# Patient Record
Sex: FEMALE | Race: WHITE | NOT HISPANIC OR LATINO
[De-identification: names, ages, dates, MRNs, and addresses within clinical notes are randomized per-mention and may not be internally consistent; named-entity substitution may affect disease eponyms.]

---

## 2017-02-23 ENCOUNTER — RECORDS - HEALTHEAST (OUTPATIENT)
Dept: ADMINISTRATIVE | Facility: OTHER | Age: 57
End: 2017-02-23

## 2017-06-27 ENCOUNTER — RECORDS - HEALTHEAST (OUTPATIENT)
Dept: ADMINISTRATIVE | Facility: OTHER | Age: 57
End: 2017-06-27

## 2017-07-11 ENCOUNTER — RECORDS - HEALTHEAST (OUTPATIENT)
Dept: ADMINISTRATIVE | Facility: OTHER | Age: 57
End: 2017-07-11

## 2017-09-25 ENCOUNTER — RECORDS - HEALTHEAST (OUTPATIENT)
Dept: ADMINISTRATIVE | Facility: OTHER | Age: 57
End: 2017-09-25

## 2017-09-28 ENCOUNTER — RECORDS - HEALTHEAST (OUTPATIENT)
Dept: ADMINISTRATIVE | Facility: OTHER | Age: 57
End: 2017-09-28

## 2017-09-29 ENCOUNTER — RECORDS - HEALTHEAST (OUTPATIENT)
Dept: ADMINISTRATIVE | Facility: OTHER | Age: 57
End: 2017-09-29

## 2017-09-30 ENCOUNTER — RECORDS - HEALTHEAST (OUTPATIENT)
Dept: ADMINISTRATIVE | Facility: OTHER | Age: 57
End: 2017-09-30

## 2017-10-02 ENCOUNTER — RECORDS - HEALTHEAST (OUTPATIENT)
Dept: ADMINISTRATIVE | Facility: OTHER | Age: 57
End: 2017-10-02

## 2017-10-06 ENCOUNTER — COMMUNICATION - HEALTHEAST (OUTPATIENT)
Dept: TELEHEALTH | Facility: CLINIC | Age: 57
End: 2017-10-06

## 2017-10-06 ENCOUNTER — OFFICE VISIT - HEALTHEAST (OUTPATIENT)
Dept: ONCOLOGY | Facility: HOSPITAL | Age: 57
End: 2017-10-06

## 2017-10-06 DIAGNOSIS — D68.51 FACTOR V LEIDEN MUTATION (H): ICD-10-CM

## 2017-10-06 DIAGNOSIS — I82.5Y9 CHRONIC DEEP VEIN THROMBOSIS (DVT) OF PROXIMAL VEIN OF LOWER EXTREMITY, UNSPECIFIED LATERALITY (H): ICD-10-CM

## 2017-10-06 RX ORDER — DOXYCYCLINE 100 MG/1
100 CAPSULE ORAL 2 TIMES DAILY
Status: SHIPPED | COMMUNITY
Start: 2017-09-29

## 2017-10-06 RX ORDER — NYSTATIN 500000 [USP'U]/1
1 TABLET, COATED ORAL 3 TIMES DAILY
Status: SHIPPED | COMMUNITY
Start: 2017-10-06

## 2017-10-06 RX ORDER — LEVOTHYROXINE SODIUM 125 UG/1
125 TABLET ORAL DAILY
Status: SHIPPED | COMMUNITY
Start: 2017-10-06

## 2017-10-06 RX ORDER — CAT'S CLAW 500 MG
CAPSULE ORAL
Status: SHIPPED | COMMUNITY
Start: 2017-10-06

## 2017-10-06 RX ORDER — CETIRIZINE HYDROCHLORIDE 10 MG/1
10 TABLET ORAL DAILY
Status: SHIPPED | COMMUNITY
Start: 2017-10-06

## 2017-10-06 RX ORDER — LIOTHYRONINE SODIUM 5 UG/1
15 TABLET ORAL DAILY
Status: SHIPPED | COMMUNITY
Start: 2017-10-06

## 2017-10-06 RX ORDER — UBIDECARENONE 100 MG
200 CAPSULE ORAL DAILY
Status: SHIPPED | COMMUNITY
Start: 2017-10-06

## 2017-10-06 RX ORDER — ASCORBIC ACID 500 MG
500 TABLET ORAL PRN
Status: SHIPPED | COMMUNITY
Start: 2017-10-06

## 2017-10-06 RX ORDER — CHLORAL HYDRATE 500 MG
4 CAPSULE ORAL 2 TIMES DAILY
Status: SHIPPED | COMMUNITY
Start: 2017-10-06

## 2017-10-06 RX ORDER — WARFARIN SODIUM 5 MG/1
5 TABLET ORAL DAILY
Status: SHIPPED | COMMUNITY
Start: 2017-10-06

## 2017-10-06 ASSESSMENT — MIFFLIN-ST. JEOR: SCORE: 1736.4

## 2019-11-10 ENCOUNTER — NURSE TRIAGE (OUTPATIENT)
Dept: NURSING | Facility: CLINIC | Age: 59
End: 2019-11-10

## 2019-11-10 NOTE — TELEPHONE ENCOUNTER
Patient says she might have a contact stuck in her eye or she may have scratched her cornea from trying to take out a contact lens yesterday afternoon.  Patient says it is constantly watering.  Patient has put saline drop in there to try to remove the lens.  Patient's  says she still has a lens in.  Reviewed care advice with caller per RN triage protocol.  Caller verbalized understanding.      Reason for Disposition    [1] Contact lens stuck in eye AND [2] unable to remove using CARE ADVICE    Additional Information    Negative: Foreign body (FB) stuck on eyeball  (Exception: contact lens)    Negative: [1] Sharp FB (even if FB was removed) AND [2] any pain present now    Negative: [1] Eye has been washed out > 30 minutes ago AND [2] feels like FB is still present    Negative: [1] Eye has been washed out > 30 minutes ago AND [2] pain persists AND [3] more than mild    Negative: [1] Eye has been washed out > 30 minutes ago AND [2] tearing or blinking persists    Negative: [1] Eye has been washed out > 30 minutes ago AND [2] blurred vision persists    Negative: FB hit eye at high speed  (e.g., small metallic chip from hammering, lawnmower, BB gun, explosion)    Protocols used: EYE - FOREIGN BODY-A-AH

## 2021-05-25 ENCOUNTER — RECORDS - HEALTHEAST (OUTPATIENT)
Dept: ADMINISTRATIVE | Facility: CLINIC | Age: 61
End: 2021-05-25

## 2021-05-31 VITALS — BODY MASS INDEX: 37.45 KG/M2 | HEIGHT: 68 IN | WEIGHT: 247.1 LBS

## 2021-06-13 NOTE — CONSULTS
[facility] Hematology and Oncology Consult Note    Patient: Shirin Johnson  MRN: [id number]  Date of Service: 10/06/2017      Reason for Visit:    1.  Recurrent venous thrombosis    Assessment/Plan:    1.  Recurrent venous thrombosis: The patient had an initial DVT in 2008.  This occurred when she was in her 40s.  Birth history was unprovoked.  She knows of recurrent DVT in the left leg.  Most likely unprovoked.  Both sound to be extensive.  She also has a family history.  All things considered, I think she should be on indefinite anticoagulation.  She should continue anticoagulation as long as it is safe and well-tolerated.  I do not think she needs further hypercoagulable testing.  I told her I did not think her children need to be tested.  She will stop her aspirin.  She had multiple questions which I believe were answered to her satisfaction.  We did talk about Xarelto.  I think this would be a good option for her.  She will look into the cost.  I told her to let us know if she needs us to prescribe that for her.  Otherwise she will follow-up on an as-needed basis.    ECOG Performance   ECOG Performance Status: 0    Distress Assessment  Distress Assessment Score: No distress    Problem List:    1. Chronic deep vein thrombosis (DVT) of proximal vein of lower extremity, unspecified laterality     2. Factor V Leiden mutation       Staging History:    No matching staging information was found for the patient.    History:    Shirin is a 57-year-old woman who is referred for follow-up regarding a DVT.  She had an initial DVT in February 2008.  She was not on estrogen at the time.  No other provoking factors per the patient's report.  She was treated with Coumadin.  More recently she was diagnosed with a second DVT in the left leg.  Both seem to be extensive.  She has swelling throughout the leg.  Not a lot of pain.  No shortness of breath or coughing currently.  She did travel to [place] 3 weeks prior to symptom  onset but she states she was never sitting in a car for more than three hours without a break.  No acute complaints today.    Past History:    Past Medical History:   Diagnosis Date     Clotting disorder      Sleep apnea     History reviewed. No pertinent family history.   Her mother has had multiple clots per the patient's report.  A maternal aunt is also had clots.     [unfilled] Social History     Social History     Marital status:      Spouse name: N/A     Number of children: N/A     Years of education: N/A     Occupational History     Not on file.     Social History Main Topics     Smoking status: Never Smoker     Smokeless tobacco: Never Used     Alcohol use No     Drug use: No     Sexual activity: No     Other Topics Concern     Not on file     Social History Narrative     No narrative on file        Allergies:    Allergies   Allergen Reactions     Casein Myalgia     Constipation, burning       Egg White      Heartburn       Gluten Myalgia     Joint pain, constipation, burning       Penicillins Hives     Tessalon [Benzonatate] Hives     Zithromax [Azithromycin] Hives     Xanax [Alprazolam] Anxiety     Review of Systems:    As above in the history.     Review of Systems otherwise Negative for:  General: chills, fever or night sweats  Psychological: anxiety or depression  Ophthalmic: blurry vision, double vision or loss of vision, vision change  ENT: epistaxis, oral lesions, hearing changes  Hematological and Lymphatic: bleeding, bruising, jaundice, swollen lymph nodes  Endocrine: hot flashes, unexpected weight changes  Respiratory: cough, hemoptysis, orthopnea or shortness of breath/ORONA  Cardiovascular: chest pain, palpitations or PND  Gastrointestinal: abdominal pain, blood in stools, change in bowel habits, constipation, diarrhea or nausea/vomiting  Genito-Urinary: change in urinary stream, incontinence, frequency/urgency  Musculoskeletal: joint pain, stiffness, swelling, muscle pain or  "weakness  Neurological: dizziness, headaches, numbness/tingling  Dermatological: lumps and rash    ECOG performance status is 0.    Pain  Currently in Pain: Yes  Pain Score (Initial OR Reassessment): 3  Pain Frequency: Constant/continuous  Location: left leg  Pain Characteristics : Aching  Pain Intervention(s): Medication (See MAR)  Response to Interventions: still there    Physical Exam:    Recent Vitals 10/6/2017   Height 5' 7.5\"   Weight 247 lbs 2 oz   BSA (m2) 2.31 m2   /72   Pulse 82   Temp 98.3   Temp src 1   SpO2 96     General: patient appears stated age of 57 y.o.. Nontoxic and in no distress.   HEENT: Head: atraumatic, normocephalic. Sclerae anicteric.  Chest:  Normal respiratory effort  Cardiac:  No edema.   Abdomen: abdomen is non-distended  Extremities: normal tone and muscle bulk.   Skin: no lesions or rash. Warm and dry.   CNS: alert and oriented x3. Grossly non-focal.   Psychiatric: normal mood and affect.     Lab Results:    No results found for this or any previous visit (from the past 168 hour(s)).    Imaging Results:    No results found.      Signed by: Benton Stoner MD    "

## 2021-06-16 PROBLEM — D68.51 FACTOR V LEIDEN MUTATION (H): Status: ACTIVE | Noted: 2017-10-06

## 2021-06-16 PROBLEM — I82.509 CHRONIC DEEP VEIN THROMBOSIS (DVT) OF LOWER EXTREMITY (H): Status: ACTIVE | Noted: 2017-10-06

## 2022-08-01 LAB — PAP SMEAR - HIM PATIENT REPORTED: NORMAL

## 2023-11-01 ENCOUNTER — TRANSFERRED RECORDS (OUTPATIENT)
Dept: MULTI SPECIALTY CLINIC | Facility: CLINIC | Age: 63
End: 2023-11-01

## 2025-05-27 ENCOUNTER — OFFICE VISIT (OUTPATIENT)
Dept: FAMILY MEDICINE | Facility: CLINIC | Age: 65
End: 2025-05-27
Payer: COMMERCIAL

## 2025-05-27 VITALS
TEMPERATURE: 98.3 F | DIASTOLIC BLOOD PRESSURE: 87 MMHG | OXYGEN SATURATION: 98 % | SYSTOLIC BLOOD PRESSURE: 146 MMHG | RESPIRATION RATE: 16 BRPM | BODY MASS INDEX: 36.63 KG/M2 | HEART RATE: 67 BPM | WEIGHT: 233.4 LBS | HEIGHT: 67 IN

## 2025-05-27 DIAGNOSIS — Z76.89 ENCOUNTER TO ESTABLISH CARE: ICD-10-CM

## 2025-05-27 DIAGNOSIS — D68.51 HETEROZYGOUS FACTOR V LEIDEN MUTATION: ICD-10-CM

## 2025-05-27 DIAGNOSIS — R01.1 SYSTOLIC MURMUR: ICD-10-CM

## 2025-05-27 DIAGNOSIS — G43.111 INTRACTABLE MIGRAINE WITH AURA WITH STATUS MIGRAINOSUS: Primary | ICD-10-CM

## 2025-05-27 DIAGNOSIS — Z86.718 PERSONAL HISTORY OF DVT (DEEP VEIN THROMBOSIS): ICD-10-CM

## 2025-05-27 DIAGNOSIS — R42 DIZZINESS: ICD-10-CM

## 2025-05-27 PROBLEM — H40.1134 PRIMARY OPEN ANGLE GLAUCOMA (POAG) OF BOTH EYES, INDETERMINATE STAGE: Status: RESOLVED | Noted: 2019-02-13 | Resolved: 2025-05-27

## 2025-05-27 PROBLEM — F41.9 ANXIETY: Status: ACTIVE | Noted: 2024-02-12

## 2025-05-27 PROBLEM — G89.29 CHRONIC PAIN OF BOTH KNEES: Status: ACTIVE | Noted: 2022-01-20

## 2025-05-27 PROBLEM — E78.5 HYPERLIPIDEMIA: Status: ACTIVE | Noted: 2025-02-27

## 2025-05-27 PROBLEM — J30.9 ALLERGIC RHINITIS: Status: ACTIVE | Noted: 2024-07-25

## 2025-05-27 PROBLEM — I48.0 PAROXYSMAL ATRIAL FIBRILLATION WITH RVR (H): Chronic | Status: ACTIVE | Noted: 2021-03-15

## 2025-05-27 PROBLEM — I48.91 ATRIAL FIBRILLATION (H): Status: RESOLVED | Noted: 2025-02-27 | Resolved: 2025-05-27

## 2025-05-27 PROBLEM — D17.1 LIPOMA OF TORSO: Status: ACTIVE | Noted: 2018-10-05

## 2025-05-27 PROBLEM — R60.0 EDEMA OF LOWER EXTREMITY: Status: ACTIVE | Noted: 2025-05-27

## 2025-05-27 PROBLEM — G44.219 EPISODIC TENSION-TYPE HEADACHE, NOT INTRACTABLE: Status: ACTIVE | Noted: 2024-04-18

## 2025-05-27 PROBLEM — M81.0 OSTEOPOROSIS: Status: ACTIVE | Noted: 2023-03-13

## 2025-05-27 PROBLEM — R93.1 ELEVATED CORONARY ARTERY CALCIUM SCORE: Status: ACTIVE | Noted: 2021-10-15

## 2025-05-27 PROBLEM — K59.00 CONSTIPATION: Status: ACTIVE | Noted: 2020-08-31

## 2025-05-27 PROBLEM — M25.561 CHRONIC PAIN OF BOTH KNEES: Status: ACTIVE | Noted: 2022-01-20

## 2025-05-27 PROBLEM — R76.8 POSITIVE ANA (ANTINUCLEAR ANTIBODY): Status: ACTIVE | Noted: 2020-08-31

## 2025-05-27 PROBLEM — G47.00 INSOMNIA: Status: ACTIVE | Noted: 2020-08-31

## 2025-05-27 PROBLEM — I87.009 POST-THROMBOTIC SYNDROME: Status: RESOLVED | Noted: 2018-05-09 | Resolved: 2025-05-27

## 2025-05-27 PROBLEM — K44.9 HIATAL HERNIA: Status: ACTIVE | Noted: 2021-07-05

## 2025-05-27 PROBLEM — I82.409 DEEP VEIN THROMBOSIS (DVT) (H): Status: ACTIVE | Noted: 2017-10-06

## 2025-05-27 PROBLEM — M25.562 CHRONIC PAIN OF BOTH KNEES: Status: ACTIVE | Noted: 2022-01-20

## 2025-05-27 PROBLEM — Z79.01 LONG TERM CURRENT USE OF ANTICOAGULANT THERAPY: Status: ACTIVE | Noted: 2025-02-27

## 2025-05-27 PROCEDURE — 3079F DIAST BP 80-89 MM HG: CPT | Performed by: STUDENT IN AN ORGANIZED HEALTH CARE EDUCATION/TRAINING PROGRAM

## 2025-05-27 PROCEDURE — 3077F SYST BP >= 140 MM HG: CPT | Performed by: STUDENT IN AN ORGANIZED HEALTH CARE EDUCATION/TRAINING PROGRAM

## 2025-05-27 PROCEDURE — 99204 OFFICE O/P NEW MOD 45 MIN: CPT | Performed by: STUDENT IN AN ORGANIZED HEALTH CARE EDUCATION/TRAINING PROGRAM

## 2025-05-27 RX ORDER — HYDROXYZINE HYDROCHLORIDE 10 MG/1
10 TABLET, FILM COATED ORAL DAILY
COMMUNITY
Start: 2025-05-27

## 2025-05-27 RX ORDER — SUMATRIPTAN SUCCINATE 25 MG/1
25 TABLET ORAL
Qty: 60 TABLET | Refills: 1 | Status: SHIPPED | OUTPATIENT
Start: 2025-05-27

## 2025-05-27 RX ORDER — ASPIRIN 81 MG/1
81 TABLET ORAL DAILY
COMMUNITY

## 2025-05-27 RX ORDER — SUMATRIPTAN SUCCINATE 25 MG/1
25 TABLET ORAL
COMMUNITY
Start: 2025-05-19 | End: 2025-05-27

## 2025-05-27 RX ORDER — APIXABAN 5 MG/1
5 TABLET, FILM COATED ORAL 2 TIMES DAILY
COMMUNITY
Start: 2024-11-05

## 2025-05-27 RX ORDER — EZETIMIBE 10 MG/1
10 TABLET ORAL DAILY
COMMUNITY
Start: 2025-05-05

## 2025-05-27 RX ORDER — MILK THISTLE 150 MG
500 CAPSULE ORAL 2 TIMES DAILY
COMMUNITY
Start: 2024-07-03

## 2025-05-27 RX ORDER — ESTRADIOL 0.1 MG/G
CREAM VAGINAL
COMMUNITY
Start: 2025-05-12

## 2025-05-27 ASSESSMENT — ENCOUNTER SYMPTOMS: HEADACHES: 1

## 2025-05-27 NOTE — PROGRESS NOTES
Assessment & Plan   Problem List Items Addressed This Visit       Heterozygous factor V Leiden mutation    Relevant Orders    CTA Head Neck with Contrast     Other Visit Diagnoses         Intractable migraine with aura with status migrainosus    -  Primary    Relevant Medications    aspirin 81 MG EC tablet    SUMAtriptan (IMITREX) 25 MG tablet    Other Relevant Orders    CTA Head Neck with Contrast    Adult Neurology  Referral      Systolic murmur        Relevant Orders    Echocardiogram Complete      Dizziness        Relevant Medications    hydrOXYzine HCl (ATARAX) 10 MG tablet    Other Relevant Orders    Echocardiogram Complete    CTA Head Neck with Contrast      Personal history of DVT (deep vein thrombosis)        Relevant Orders    CTA Head Neck with Contrast           Intractable migraine with aura with status migrainosus:  - New migraines triggered in the setting of stress, history of anxiety and panic disorder.  She does have a personal history of headaches, and family history of migraines.  Suspect her symptoms described low are representative of aura and anxiety with the migraine, but given her report of full blackout vision in the acuity of the migraines, in the context of her factor V mutation and clot history, reasonable to get CTA to ensure no change.  Notably, neuro exam today is normal.  - Referral to neurology if CTA normal. Continue sumatriptan and anxiety measures.     Addendum 5/27: CTA completed this afternoon at a local hospital and returned normal, please referral to neurology.    Systolic murmur:  - New heart murmur detected, possibly related to mild leaky valve noted on echo in 2022.  - Echocardiogram (heart ultrasound) to be done non-urgently at [facility] -she will call to schedule    Heterozygous factor V Leiden mutation:  - History of Factor V Leiden mutation with personal history of DVT.  - Consideration of Factor V Leiden in the context of migraine and dizziness  "symptoms. Special head CT to assess blood vessels.    Personal history of DVT (deep vein thrombosis):  - History of DVT with current management on Eliquis.  - Continue current management.       Patient expressed understanding and agreement with this plan.    Consent was obtained from the patient to use an AI documentation tool in the creation of this note.      I spent a total of 50 minutes on the day of the visit.   Time spent by me today doing chart review, history and exam, documentation and further activities per the note      Subjective   Shirin is a 64 year old, presenting for the following health issues:  Headache (Headaches for 2.5 weeks. At first they were very constant, but now they are about every other day. Pt states last week on 5/19 she went to the [facility] ER for dizziness and headaches )        5/27/2025     2:02 PM   Additional Questions   Roomed by Rc     Via the Health Maintenance questionnaire, the patient has reported the following services have been completed -Cervical Cancer Screening: [facility] 2022-08-01-Colonscopy: [facility] 2023-11-01, this information has been sent to the abstraction team.  Headache   This is a new problem. The current episode started more than 1 week ago. The pain is located in the Frontal region. The pain is at a severity of 4/10. The pain is moderate. The pain does not radiate.   History of Present Illness       Headaches:   Since the patient's last clinic visit, headaches are: worsened  The patient is getting headaches:  Daily  She is not able to do normal daily activities when she has a migraine.  The patient is taking the following rescue/relief medications:  Tylenol and sumatriptan (Imitrex)   Patient states \"I get some relief\" from the rescue/relief medications.   The patient is taking the following medications to prevent migraines:  No medications to prevent migraines  In the past 4 weeks, the patient has gone to an Urgent Care or Emergency " Room 2 times times due to headaches.    She eats 2-3 servings of fruits and vegetables daily.She consumes 0 sweetened beverage(s) daily.She exercises with enough effort to increase her heart rate 9 or less minutes per day.  She exercises with enough effort to increase her heart rate 3 or less days per week.   She is taking medications regularly.   Shirin Johnson, a 64-year-old female, presented with headaches and vision changes. She is concerned about recent episodes of vision going black momentarily, followed by panic attacks and severe headaches.    Headaches and Vision Changes  - Headaches for 2.5 weeks, initially constant, now occurring every other day.  - Visited the ER twice last week for headaches.  - Diagnosed with postmenopausal vestibular migraine by Dr. Sawyer, attributed to stress.  - Functional medicine doctor diagnosed mold toxicity and histamine overload, suspected to contribute to migraines.  - Experiences dry mouth and sensitivity to drugs; complete allergy testing last summer showed no allergies.  - Recent episodes of vision going black momentarily, followed by panic attacks and severe headaches.  - Vision changes include difficulty focusing and slight blurriness during headaches.  - History of panic attacks at night; recent panic attacks associated with hot flashes in the head.  - Family history of headaches; mother and both biological sons have migraines.  - Previous headaches during medication withdrawal in February and March 2023.    Anxiety and Stress  - History of moderate anxiety; previously on buspirone for anxiety related to heart concerns.  - Increased anxiety at the end of 2023; dosage of buspirone increased but led to cognitive issues.  - Experienced rebound panic attacks after stopping lorazepam, leading to suicidal thoughts when switched to Paxil.  - Off work for 6 to 8 weeks due to anxiety and headaches earlier this year.  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    Cardiac History  - History of supraventricular tachycardia (SVT) during COVID, treated with metoprolol which led to atrial fibrillation (A-fib).  - Hospitalized for A-fib; resolved after discontinuing metoprolol.    Family Stress  - Three adult children temporarily living at home, contributing to stress.  - Son moved back home from [place], living on an air mattress in the office until moving to [place].    Previous Medical History  - Factor V Leiden with two deep vein thromboses (DVTs), one chronic with stents placed.  - History of high eye pressures treated with laser surgery; pressures now stable.  - Previous dizziness linked to Lyme disease, tested negative recently.          Objective    BP (!) 146/87   Pulse 67   Temp 98.3  F (36.8  C) (Tympanic)   Resp 16   Ht 1.702 m (5' 7\")   Wt 105.9 kg (233 lb 6.4 oz)   SpO2 98%   BMI 36.56 kg/m    Body mass index is 36.56 kg/m .  Physical Exam  Constitutional:       General: She is not in acute distress.     Appearance: Normal appearance. She is not ill-appearing.   HENT:      Nose: Nose normal.      Mouth/Throat:      Mouth: Mucous membranes are moist.   Eyes:      Extraocular Movements: Extraocular movements intact.      Conjunctiva/sclera: Conjunctivae normal.      Pupils: Pupils are equal, round, and reactive to light.   Cardiovascular:      Rate and Rhythm: Normal rate and regular rhythm.      Pulses: Normal pulses.      Heart sounds: Murmur (Systolic, 2/6) heard.   Pulmonary:      Effort: Pulmonary effort is normal.      Breath sounds: Normal breath sounds.   Musculoskeletal:      Right lower leg: No edema.      Left lower leg: No edema.   Skin:     General: Skin is warm.   Neurological:      General: No focal deficit present.      Mental Status: She is alert.      Cranial Nerves: No cranial nerve deficit.      Motor: No weakness.      Gait: Gait normal.   Psychiatric:         Mood and Affect: Mood " normal.         Behavior: Behavior normal.                Signed Electronically by: Sophy Jeronimo MD

## 2025-05-27 NOTE — PATIENT INSTRUCTIONS
PLAN:   - get special head CT - today or tomorrow - to assess blood vessels   - if normal, I will place referral to neurology, more urgently  - in the meantime, continue sumatriptan and anxiety measures  - get echo (heart ultrasound) nonurgently at [facility], in the next 2 weeks or so (unlikely related to migraine issue)

## 2025-05-28 ENCOUNTER — ANCILLARY PROCEDURE (OUTPATIENT)
Dept: CARDIOLOGY | Facility: CLINIC | Age: 65
End: 2025-05-28
Attending: STUDENT IN AN ORGANIZED HEALTH CARE EDUCATION/TRAINING PROGRAM
Payer: COMMERCIAL

## 2025-05-28 DIAGNOSIS — R42 DIZZINESS: ICD-10-CM

## 2025-05-28 DIAGNOSIS — R01.1 SYSTOLIC MURMUR: ICD-10-CM

## 2025-05-28 PROCEDURE — 93306 TTE W/DOPPLER COMPLETE: CPT | Performed by: INTERNAL MEDICINE

## 2025-05-29 ENCOUNTER — RESULTS FOLLOW-UP (OUTPATIENT)
Dept: FAMILY MEDICINE | Facility: CLINIC | Age: 65
End: 2025-05-29

## 2025-06-18 ENCOUNTER — OFFICE VISIT (OUTPATIENT)
Dept: FAMILY MEDICINE | Facility: CLINIC | Age: 65
End: 2025-06-18
Payer: COMMERCIAL

## 2025-06-18 VITALS
RESPIRATION RATE: 16 BRPM | DIASTOLIC BLOOD PRESSURE: 81 MMHG | SYSTOLIC BLOOD PRESSURE: 126 MMHG | BODY MASS INDEX: 35.4 KG/M2 | HEART RATE: 75 BPM | TEMPERATURE: 97.9 F | WEIGHT: 226 LBS | OXYGEN SATURATION: 96 %

## 2025-06-18 DIAGNOSIS — Z11.59 ENCOUNTER FOR HEPATITIS C SCREENING TEST FOR LOW RISK PATIENT: ICD-10-CM

## 2025-06-18 DIAGNOSIS — K76.9 LIVER LESION: ICD-10-CM

## 2025-06-18 DIAGNOSIS — G43.809 VESTIBULAR MIGRAINE: ICD-10-CM

## 2025-06-18 DIAGNOSIS — E03.9 HYPOTHYROIDISM, UNSPECIFIED TYPE: ICD-10-CM

## 2025-06-18 DIAGNOSIS — K76.0 FATTY LIVER: ICD-10-CM

## 2025-06-18 DIAGNOSIS — F41.9 ANXIETY: ICD-10-CM

## 2025-06-18 DIAGNOSIS — G43.111 INTRACTABLE MIGRAINE WITH AURA WITH STATUS MIGRAINOSUS: Primary | ICD-10-CM

## 2025-06-18 PROBLEM — R01.1 SYSTOLIC MURMUR: Status: ACTIVE | Noted: 2025-06-18

## 2025-06-18 PROCEDURE — 87340 HEPATITIS B SURFACE AG IA: CPT | Performed by: STUDENT IN AN ORGANIZED HEALTH CARE EDUCATION/TRAINING PROGRAM

## 2025-06-18 PROCEDURE — 80076 HEPATIC FUNCTION PANEL: CPT | Performed by: STUDENT IN AN ORGANIZED HEALTH CARE EDUCATION/TRAINING PROGRAM

## 2025-06-18 PROCEDURE — 3079F DIAST BP 80-89 MM HG: CPT | Performed by: STUDENT IN AN ORGANIZED HEALTH CARE EDUCATION/TRAINING PROGRAM

## 2025-06-18 PROCEDURE — G2211 COMPLEX E/M VISIT ADD ON: HCPCS | Performed by: STUDENT IN AN ORGANIZED HEALTH CARE EDUCATION/TRAINING PROGRAM

## 2025-06-18 PROCEDURE — 99214 OFFICE O/P EST MOD 30 MIN: CPT | Performed by: STUDENT IN AN ORGANIZED HEALTH CARE EDUCATION/TRAINING PROGRAM

## 2025-06-18 PROCEDURE — 86803 HEPATITIS C AB TEST: CPT | Performed by: STUDENT IN AN ORGANIZED HEALTH CARE EDUCATION/TRAINING PROGRAM

## 2025-06-18 PROCEDURE — 3074F SYST BP LT 130 MM HG: CPT | Performed by: STUDENT IN AN ORGANIZED HEALTH CARE EDUCATION/TRAINING PROGRAM

## 2025-06-18 PROCEDURE — 36415 COLL VENOUS BLD VENIPUNCTURE: CPT | Performed by: STUDENT IN AN ORGANIZED HEALTH CARE EDUCATION/TRAINING PROGRAM

## 2025-06-18 RX ORDER — LEVOTHYROXINE SODIUM 137 UG/1
137 TABLET ORAL DAILY
COMMUNITY
Start: 2025-05-27

## 2025-06-18 RX ORDER — EPINEPHRINE 0.3 MG/.3ML
0.3 INJECTION SUBCUTANEOUS PRN
COMMUNITY
Start: 2025-06-12

## 2025-06-18 ASSESSMENT — PATIENT HEALTH QUESTIONNAIRE - PHQ9
SUM OF ALL RESPONSES TO PHQ QUESTIONS 1-9: 3
SUM OF ALL RESPONSES TO PHQ QUESTIONS 1-9: 3
10. IF YOU CHECKED OFF ANY PROBLEMS, HOW DIFFICULT HAVE THESE PROBLEMS MADE IT FOR YOU TO DO YOUR WORK, TAKE CARE OF THINGS AT HOME, OR GET ALONG WITH OTHER PEOPLE: SOMEWHAT DIFFICULT

## 2025-06-18 NOTE — PROGRESS NOTES
"  Assessment & Plan     Intractable migraine with aura with status migrainosus  Vestibular migraine  Improved greatly by treatments from functional medicine doctor, which has included hydroxyzine for concern for histamine response.  I also suspect this is helping the anxiety component of the migraines overnight.    Fatty liver  Previously noted, will check labs today as they have not been checked in a few years  - Hepatic panel (Albumin, ALT, AST, Bili, Alk Phos, TP); Future  - Hepatitis C Screen Reflex to HCV RNA Quant and Genotype; Future  - Hepatitis B surface antigen; Future  - Hepatic panel (Albumin, ALT, AST, Bili, Alk Phos, TP)  - Hepatitis C Screen Reflex to HCV RNA Quant and Genotype  - Hepatitis B surface antigen    Encounter for hepatitis C screening test for low risk patient  Agrees to screening  - Hepatitis C Screen Reflex to HCV RNA Quant and Genotype; Future  - Hepatitis C Screen Reflex to HCV RNA Quant and Genotype    Hypothyroidism, unspecified type  Concerns with intolerance of levothyroxine with brand change.  Discussed physiologic/other reasons for slight variability in TSH, I believe she would be fine to stay on regular levothyroxine, not likely to benefit significantly from Tirosint    Anxiety  Something she is working on through many modalities as described below.  Suspect hydroxyzine helping as well.    Liver lesion  Noted on abdominal CT ordered a month ago 5/20 for nonspecific abdominal pain.  It showed: \"2.6 cm hypoattenuating focus in hepatic segment VII, indeterminate, could represent area of focal fatty infiltration, hepatic hemangioma versus other hepatic lesions, recommend further evaluation with contrast-enhanced MRI for more definite characterization.\"  Attempted MRI earlier today as described below, was unable to complete due to anxiety, and notes she is unable to tolerate benzos.  Given her history of fatty liver the size/appearance of the lesion, I feel it is safe to monitor for " now, recheck labs as above, and hold off on pursuing further imaging as it would likely require full sedation.    The longitudinal plan of care for the diagnosis(es)/condition(s) as documented were addressed during this visit. Due to the added complexity in care, I will continue to support Shirin in the subsequent management and with ongoing continuity of care.        Subjective   Shirni is a 64 year old, presenting for the following health issues:  Follow Up (Follow up on CT of head)        6/18/2025     1:48 PM   Additional Questions   Roomed by Adamaris-ROHAN     History of Present Illness       Reason for visit:  Ct scan followup    She eats 4 or more servings of fruits and vegetables daily.She consumes 0 sweetened beverage(s) daily.She exercises with enough effort to increase her heart rate 9 or less minutes per day.  She exercises with enough effort to increase her heart rate 3 or less days per week.   She is taking medications regularly.   Shirin Johnson, 64 years    Vestibular Migraines  - Diagnosed with vestibular migraines linked to histamine and mast cell activation.  - Symptoms included panic migraines occurring in the middle of the night.  - Treatment with 10 mg of hydroxyzine at bedtime and upon waking in the middle of the night, which stopped the panic migraines.  - Adopted a low histamine diet and used quercetin.  - Discovered a genetic predisposition to histamine intolerance, leading to the use of a histamine digest enzyme.  - Headaches have slowly faded away.  - Vision issues were attributed to vestibular migraines, with the optic nerve being the last to improve.  - They have not used sumatriptan for at least a week, only Tylenol as needed.    Fatty Liver  - Diagnosed with fatty liver several years ago when 30 lbs heavier.  - Liver enzymes were abnormal at the time of diagnosis.  - Lost 30 lbs and liver enzymes have been normal for the last 12 years.  - A CT scan flagged a small area on the liver,  less than 3 cm, possibly focal fatty infiltration.  - An ultrasound in 2014 confirmed fatty liver with no other abnormalities.  - Attempted an open MRI in [place] but was unable to complete it due to anxiety.    Anxiety and Sensitivity to Medications  - Experienced a severe reaction to benzodiazepines a year ago, leading to outpatient anxiety treatment.  - Underwent brain retraining and craniosacral therapy to manage anxiety.  - Avoids benzodiazepines due to past adverse reactions.    Allergy Testing  - Underwent extensive allergy testing in September, all results were negative.  - Determined to have a histamine response rather than an allergen response.    Thyroid Variability  - Noted variability in thyroid test results, with a TSH of 0.something in January and 2.84 in March.  - Sensitive to generic levothyroxine, with concerns about variability due to changes in generics.    Menopausal Status  - In menopause since age 53, approximately 10 years.    Family History  - Their mother had breast cancer and passed away, leading to a cautious approach to health screenings.          Objective    /81   Pulse 75   Temp 97.9  F (36.6  C) (Tympanic)   Resp 16   Wt 102.5 kg (226 lb)   SpO2 96%   BMI 35.40 kg/m    Body mass index is 35.4 kg/m .  Physical Exam  Constitutional:       General: She is not in acute distress.     Appearance: Normal appearance. She is not ill-appearing.   HENT:      Nose: Nose normal.      Mouth/Throat:      Mouth: Mucous membranes are moist.   Eyes:      Conjunctiva/sclera: Conjunctivae normal.   Cardiovascular:      Rate and Rhythm: Normal rate.   Pulmonary:      Effort: Pulmonary effort is normal.   Skin:     General: Skin is warm.   Neurological:      General: No focal deficit present.      Mental Status: She is alert.   Psychiatric:         Mood and Affect: Mood normal.         Behavior: Behavior normal.                  Signed Electronically by: Sophy Jeronimo MD

## 2025-06-19 ENCOUNTER — RESULTS FOLLOW-UP (OUTPATIENT)
Dept: FAMILY MEDICINE | Facility: CLINIC | Age: 65
End: 2025-06-19

## 2025-06-19 LAB
ALBUMIN SERPL BCG-MCNC: 4.5 G/DL (ref 3.5–5.2)
ALP SERPL-CCNC: 91 U/L (ref 40–150)
ALT SERPL W P-5'-P-CCNC: 34 U/L (ref 0–50)
AST SERPL W P-5'-P-CCNC: 29 U/L (ref 0–45)
BILIRUB SERPL-MCNC: 0.3 MG/DL
BILIRUBIN DIRECT (ROCHE PRO & PURE): 0.13 MG/DL (ref 0–0.45)
HBV SURFACE AG SERPL QL IA: NONREACTIVE
HCV AB SERPL QL IA: NONREACTIVE
PROT SERPL-MCNC: 7.4 G/DL (ref 6.4–8.3)

## 2025-06-26 ENCOUNTER — TRANSFERRED RECORDS (OUTPATIENT)
Dept: HEALTH INFORMATION MANAGEMENT | Facility: CLINIC | Age: 65
End: 2025-06-26
Payer: COMMERCIAL

## 2025-07-17 ENCOUNTER — LAB (OUTPATIENT)
Dept: LAB | Facility: CLINIC | Age: 65
End: 2025-07-17
Payer: COMMERCIAL

## 2025-07-17 DIAGNOSIS — Z77.120 CONTACT WITH MOLD: ICD-10-CM

## 2025-07-17 DIAGNOSIS — K59.00 CONSTIPATION: ICD-10-CM

## 2025-07-17 DIAGNOSIS — D89.40 MAST CELL ACTIVATION: ICD-10-CM

## 2025-07-17 DIAGNOSIS — M25.50 PAIN IN JOINT, MULTIPLE SITES: ICD-10-CM

## 2025-07-17 DIAGNOSIS — F41.9 ANXIETY DISORDER OF CHILDHOOD OR ADOLESCENCE: ICD-10-CM

## 2025-07-17 DIAGNOSIS — I51.9 MYXEDEMA HEART DISEASE: ICD-10-CM

## 2025-07-17 DIAGNOSIS — E03.9 MYXEDEMA HEART DISEASE: ICD-10-CM

## 2025-07-17 DIAGNOSIS — E66.9 OBESITY, UNSPECIFIED: ICD-10-CM

## 2025-07-17 LAB
T3FREE SERPL-MCNC: 2.8 PG/ML (ref 2–4.4)
T4 FREE SERPL-MCNC: 1.26 NG/DL (ref 0.9–1.7)
TSH SERPL DL<=0.005 MIU/L-ACNC: 0.25 UIU/ML (ref 0.3–4.2)

## 2025-08-04 ENCOUNTER — LAB (OUTPATIENT)
Dept: LAB | Facility: CLINIC | Age: 65
End: 2025-08-04
Payer: COMMERCIAL

## 2025-08-04 DIAGNOSIS — E03.9 HYPOTHYROIDISM, UNSPECIFIED: Primary | ICD-10-CM

## 2025-08-04 LAB
T3FREE SERPL-MCNC: 2.5 PG/ML (ref 2–4.4)
T4 FREE SERPL-MCNC: 1.32 NG/DL (ref 0.9–1.7)
TSH SERPL DL<=0.005 MIU/L-ACNC: 0.53 UIU/ML (ref 0.3–4.2)

## 2025-08-04 PROCEDURE — 84439 ASSAY OF FREE THYROXINE: CPT

## 2025-08-04 PROCEDURE — 36415 COLL VENOUS BLD VENIPUNCTURE: CPT

## 2025-08-04 PROCEDURE — 84481 FREE ASSAY (FT-3): CPT

## 2025-08-04 PROCEDURE — 84443 ASSAY THYROID STIM HORMONE: CPT

## 2025-08-28 ENCOUNTER — OFFICE VISIT (OUTPATIENT)
Dept: FAMILY MEDICINE | Facility: CLINIC | Age: 65
End: 2025-08-28
Payer: COMMERCIAL

## 2025-08-28 ENCOUNTER — ANCILLARY PROCEDURE (OUTPATIENT)
Dept: GENERAL RADIOLOGY | Facility: CLINIC | Age: 65
End: 2025-08-28
Attending: NURSE PRACTITIONER
Payer: COMMERCIAL

## 2025-08-28 VITALS
BODY MASS INDEX: 37.48 KG/M2 | HEART RATE: 68 BPM | OXYGEN SATURATION: 96 % | HEIGHT: 67 IN | TEMPERATURE: 98.3 F | SYSTOLIC BLOOD PRESSURE: 118 MMHG | WEIGHT: 238.8 LBS | DIASTOLIC BLOOD PRESSURE: 70 MMHG

## 2025-08-28 DIAGNOSIS — M25.571 PAIN IN JOINT INVOLVING ANKLE AND FOOT, RIGHT: Primary | ICD-10-CM

## 2025-08-28 DIAGNOSIS — S93.491A SPRAIN OF ANTERIOR TALOFIBULAR LIGAMENT OF RIGHT ANKLE, INITIAL ENCOUNTER: ICD-10-CM

## 2025-08-28 RX ORDER — LEVOTHYROXINE SODIUM 125 UG/1
125 CAPSULE ORAL DAILY
COMMUNITY